# Patient Record
Sex: FEMALE | Race: BLACK OR AFRICAN AMERICAN | NOT HISPANIC OR LATINO | URBAN - METROPOLITAN AREA
[De-identification: names, ages, dates, MRNs, and addresses within clinical notes are randomized per-mention and may not be internally consistent; named-entity substitution may affect disease eponyms.]

---

## 2020-09-02 ENCOUNTER — LAB OUTSIDE AN ENCOUNTER (OUTPATIENT)
Dept: URBAN - METROPOLITAN AREA CLINIC 118 | Facility: CLINIC | Age: 55
End: 2020-09-02

## 2020-09-02 ENCOUNTER — OFFICE VISIT (OUTPATIENT)
Dept: URBAN - METROPOLITAN AREA CLINIC 118 | Facility: CLINIC | Age: 55
End: 2020-09-02
Payer: COMMERCIAL

## 2020-09-02 ENCOUNTER — DASHBOARD ENCOUNTERS (OUTPATIENT)
Age: 55
End: 2020-09-02

## 2020-09-02 DIAGNOSIS — K63.5 COLON POLYPS: ICD-10-CM

## 2020-09-02 DIAGNOSIS — Z12.11 SCREEN FOR COLON CANCER: ICD-10-CM

## 2020-09-02 PROCEDURE — 99204 OFFICE O/P NEW MOD 45 MIN: CPT | Performed by: INTERNAL MEDICINE

## 2020-09-02 RX ORDER — ATORVASTATIN CALCIUM 80 MG/1
1 TABLET TABLET, FILM COATED ORAL ONCE A DAY
Status: ACTIVE | COMMUNITY

## 2020-09-02 RX ORDER — OLMESARTAN MEDOXOMIL, AMLODIPINE AND HYDROCHLOROTHIAZIDE 40; 10; 25 MG/1; MG/1; MG/1
1 TABLET TABLET, FILM COATED ORAL ONCE A DAY
Status: ACTIVE | COMMUNITY

## 2020-09-02 NOTE — HPI-TODAY'S VISIT:
pt h/o colon polyps now for colonoscopy. Reports mild non-specifc bowel changes. Denies nausea, vomiting ,gerd or dysphagia. Denies weight loss or anemia. Reports last colon was 2-3 years ago and was to have repeat procedure. Denies other complaints.

## 2020-10-06 ENCOUNTER — OFFICE VISIT (OUTPATIENT)
Dept: URBAN - METROPOLITAN AREA SURGERY CENTER 23 | Facility: SURGERY CENTER | Age: 55
End: 2020-10-06
Payer: COMMERCIAL

## 2020-10-06 DIAGNOSIS — D12.3 ADENOMATOUS POLYP OF TRANSVERSE COLON: ICD-10-CM

## 2020-10-06 DIAGNOSIS — Z12.11 COLON CANCER SCREENING: ICD-10-CM

## 2020-10-06 PROCEDURE — G9933 CANC DETECTD DURING COL SCRN: HCPCS | Performed by: INTERNAL MEDICINE

## 2020-10-06 PROCEDURE — 45385 COLONOSCOPY W/LESION REMOVAL: CPT | Performed by: INTERNAL MEDICINE

## 2020-10-06 PROCEDURE — G8907 PT DOC NO EVENTS ON DISCHARG: HCPCS | Performed by: INTERNAL MEDICINE
